# Patient Record
Sex: FEMALE | Race: WHITE | NOT HISPANIC OR LATINO | URBAN - METROPOLITAN AREA
[De-identification: names, ages, dates, MRNs, and addresses within clinical notes are randomized per-mention and may not be internally consistent; named-entity substitution may affect disease eponyms.]

---

## 2017-02-08 ENCOUNTER — OFFICE VISIT - RIVER FALLS (OUTPATIENT)
Dept: FAMILY MEDICINE | Facility: CLINIC | Age: 57
End: 2017-02-08

## 2017-02-08 ASSESSMENT — MIFFLIN-ST. JEOR: SCORE: 1256.01

## 2017-05-22 ENCOUNTER — OFFICE VISIT - RIVER FALLS (OUTPATIENT)
Dept: FAMILY MEDICINE | Facility: CLINIC | Age: 57
End: 2017-05-22

## 2017-05-22 ASSESSMENT — MIFFLIN-ST. JEOR: SCORE: 1273.7

## 2022-02-12 VITALS
BODY MASS INDEX: 27.72 KG/M2 | WEIGHT: 162.4 LBS | HEIGHT: 64 IN | SYSTOLIC BLOOD PRESSURE: 122 MMHG | HEART RATE: 76 BPM | TEMPERATURE: 98.9 F | DIASTOLIC BLOOD PRESSURE: 70 MMHG

## 2022-02-12 VITALS
HEIGHT: 64 IN | BODY MASS INDEX: 27.06 KG/M2 | HEART RATE: 76 BPM | WEIGHT: 158.5 LBS | TEMPERATURE: 97.9 F | SYSTOLIC BLOOD PRESSURE: 114 MMHG | DIASTOLIC BLOOD PRESSURE: 60 MMHG

## 2022-02-15 NOTE — PROGRESS NOTES
Patient:   DANNIELLE PULLIAM            MRN: [id number]            FIN: [id number]               Age:   56 years     Sex:  Female     :  1960   Associated Diagnoses:   ADHD (Attention Deficit Hyperactivity Disorder); Moderate recurrent major depression   Author:   Angel Ruffin MD      Chief Complaint   2017 3:28 PM CDT    Pt is here for med check      Interval History   Here for ADHD follow up. Has been going well with current medication.  Patient moving to [place] next week.  got called to a new Faith. Excited to be closer to their kids but sad to leave after 16 years.  Needs to refill medication. Working well.   Will pursue preventive services in [place] once they are settled.      Health Status   Allergies:    Allergic Reactions (All)  No Known Medication Allergies  Canceled/Inactive Reactions (All)  No known allergies   Medications:  (Selected)   Prescriptions  Prescribed  Adderall 10 mg oral tablet: 1 tab(s) ( 10 mg ), PO, BID, # 180 tab(s), 0 Refill(s), Type: Soft Stop, Pharmacy: [facility], 1 tab(s) po bid  citalopram 40 mg oral tablet: 1 tab(s) ( 40 mg ), po, daily, # 90 tab(s), 3 Refill(s), Type: Maintenance, Pharmacy: [facility], 1 tab(s) po daily  estradiol 1 mg oral tablet: See Instructions, Instructions: TAKE 1 TABLET DAILY, # 90 tab(s), 3 Refill(s), Type: Soft Stop, Pharmacy: [facility]  lamoTRIgine 200 mg oral tablet, extended release: 1 tab(s) ( 200 mg ), po, daily, # 90 tab(s), 3 Refill(s), Type: Maintenance, Pharmacy: [facility], 1 tab(s) po daily  medroxyPROGESTERone 2.5 mg oral tablet: See Instructions, Instructions: TAKE 1 TABLET DAILY, # 90 tab(s), 3 Refill(s), Type: Soft Stop, Pharmacy: [facility]  Documented Medications  Documented  antihistamines: antihistamines, daily, Supply, PRN: dermatitis, 0 Refill(s), Type: Maintenance  loratadine 10 mg oral capsule: 1 cap(s) ( 10 mg ),  po, daily, 0 Refill(s), Type: Maintenance   Problem list:    All Problems (Selected)  Moderate recurrent major depression / SNOMED CT 49191003 / Confirmed  Family history of colon cancer / SNOMED CT 739293449 / Confirmed  ADHD (Attention Deficit Hyperactivity Disorder) / SNOMED CT 20053345 / Confirmed      Histories   Past Medical History:    Active  ADHD (Attention Deficit Hyperactivity Disorder) (SNOMED CT 18712236)  Moderate recurrent major depression (SNOMED CT 03312629)  Resolved  Osteopenia (ICD-9-.90):  Resolved.  Obesity (SNOMED CT 1669503377):  Resolved.  Pregnancy (SNOMED CT 347528414):  Resolved on 12/29/1990 at 30 years.  Pregnancy (SNOMED CT 032121875):  Resolved on 9/17/1995 at 35 years.   Family History:    Aneurysm  Mother  Comments:  7/26/2012 2:10 PM - Amira Miranda LPN  Brain  Diabetes mellitus  Father  Comments:  7/26/2012 2:10 PM - Amira Miranda LPN  Type 2 DM  Hypertension  Mother  CAD - Coronary artery disease  Father     Procedure history:    Colonoscopy ([id number]) on 1/18/2011 at 50 Years.  Comments:  6/30/2011 9:07 AM - Trinity Epps CMA  q 5 years  LEEP on 1/1/1997 at 36 Years.  R ankle surgery in 1991 at 31 Years.   Social History:        Alcohol Assessment: Current            Wine (5 oz), 5-7 x's per week, 1 drinks/episode average.  2 drinks/episode maximum.      Tobacco Assessment: Denies Tobacco Use            Never      Substance Abuse Assessment: Denies Substance Abuse            Never      Employment and Education Assessment            Employed                     Comments:                      03/29/2016 - Amira Miranda LPN                     [employer]       Home and Environment Assessment            Marital status:  (Living together).  Spouse/Partner name: Edmond.  Lives with Children, Spouse.  2               children.  Living situation: Home/Independent.      Nutrition and Health Assessment            Type of diet: Regular, Calorie restricted.       Exercise and Physical Activity Assessment: Regular exercise            Exercise frequency: 4-5 x's weekly..  Exercise type: Walking.                     Comments:                      07/26/2012 - Ruth LOBATO, Amira                     Cardio,strength      Sexual Assessment            Sexually active: No.  Sexual orientation: Heterosexual.  Other contraceptive use:  had vasectomy.      Other Assessment            First menses age 15-16.  Irregular menses.  No history of abnormal Pap smear.        Physical Examination   Vital Signs   5/22/2017 3:28 PM CDT Temperature Tympanic 98.9 DegF    Peripheral Pulse Rate 76 bpm    Pulse Site Radial artery    HR Method Manual    Systolic Blood Pressure 122 mmHg    Diastolic Blood Pressure 70 mmHg    Mean Arterial Pressure 87 mmHg    BP Site Left arm    BP Method Manual      Measurements from flowsheet : Measurements   5/22/2017 3:28 PM CDT Height Measured - Standard 63.5 in    Weight Measured - Standard 162.4 lb    BSA 1.81 m2    Body Mass Index 28.31 kg/m2      General:  Alert and oriented, No acute distress.    Psychiatric:  Cooperative, Appropriate mood & affect, Normal judgment, Non-suicidal.       Impression and Plan   Diagnosis     ADHD (Attention Deficit Hyperactivity Disorder) (EXG36-HI F90.2).     Moderate recurrent major depression (AWG56-PO F33.1).     Orders     Orders (Selected)   Prescriptions  Prescribed  Adderall 10 mg oral tablet: 1 tab(s) ( 10 mg ), PO, BID, # 180 tab(s), 0 Refill(s), Type: Soft Stop, Pharmacy: [facility], 1 tab(s) po bid  citalopram 40 mg oral tablet: 1 tab(s) ( 40 mg ), po, daily, # 90 tab(s), 3 Refill(s), Type: Maintenance, Pharmacy: [facility], 1 tab(s) po daily  lamoTRIgine 200 mg oral tablet, extended release: 1 tab(s) ( 200 mg ), po, daily, # 90 tab(s), 3 Refill(s), Type: Maintenance, Pharmacy: [facility], 1 tab(s) po daily.

## 2022-02-15 NOTE — PROGRESS NOTES
Patient:   DANNIELLE PULLIAM            MRN: [id number]            FIN: [id number]               Age:   56 years     Sex:  Female     :  1960   Associated Diagnoses:   ADHD (Attention Deficit Hyperactivity Disorder); Family history of colon cancer   Author:   Angel Ruffin MD      Chief Complaint   2017 11:06 AM CST    ADHD med check      Interval History   Here for ADHD follow up. Doing well. No side effects noted.  Patient reports that estrace and medroxyprogesterone are working very well. Will continue same dose for a year and then consider decreasing estrogen dose.  Has family hx of colon cancer. Due for 5 year colonoscopy.         Health Status   Allergies:    Allergic Reactions (All)  No Known Medication Allergies  Canceled/Inactive Reactions (All)  No known allergies   Medications:  (Selected)   Prescriptions  Prescribed  Adderall 10 mg oral tablet: 1 tab(s) ( 10 mg ), PO, BID, # 180 tab(s), 0 Refill(s), Type: Soft Stop, Pharmacy: [facility], 1 tab(s) po bid  Estrace 1 mg oral tablet: 1 tab(s) ( 1 mg ), PO, Daily, # 90 tab(s), 2 Refill(s), Type: Maintenance, Pharmacy: [facility], 1 tab(s) po daily  citalopram 40 mg oral tablet: 1 tab(s) ( 40 mg ), po, daily, # 90 tab(s), 3 Refill(s), Type: Maintenance, Pharmacy: [facility], 1 tab(s) po daily  lamoTRIgine 200 mg oral tablet, extended release: 1 tab(s) ( 200 mg ), po, daily, # 90 tab(s), 3 Refill(s), Type: Maintenance, Pharmacy: [facility], 1 tab(s) po daily  medroxyPROGESTERone 2.5 mg oral tablet: 1 tab(s) ( 2.5 mg ), PO, Daily, # 90 tab(s), 2 Refill(s), Type: Maintenance, Pharmacy: [facility], 1 tab(s) po daily  Documented Medications  Documented  antihistamines: antihistamines, daily, Supply, PRN: dermatitis, 0 Refill(s), Type: Maintenance   Problem list:    All Problems (Selected)  ADHD (Attention Deficit Hyperactivity Disorder) / SNOMED CT  00757365 / Confirmed  Moderate recurrent major depression / SNOMED CT 37694532 / Confirmed  Obesity / SNOMED CT 4199125249 / Probable      Histories   Past Medical History:    Active  ADHD (Attention Deficit Hyperactivity Disorder) (SNOMED CT 03759654)  Moderate recurrent major depression (SNOMED CT 59806184)  Resolved  Osteopenia (ICD-9-.90):  Resolved.  Pregnancy (SNOMED CT 684491938):  Resolved on 12/29/1990 at 30 years.  Pregnancy (SNOMED CT 825080448):  Resolved on 9/17/1995 at 35 years.   Family History:    Aneurysm  Mother  Comments:  7/26/2012 2:10 PM - Amira Miranda LPN  Brain  Diabetes mellitus  Father  Comments:  7/26/2012 2:10 PM - Amira Miranda LPN  Type 2 DM  Hypertension  Mother  CAD - Coronary artery disease  Father     Procedure history:    Colonoscopy ([id number]) on 1/18/2011 at 50 Years.  Comments:  6/30/2011 9:07 AM - Trinity Epps CMA  q 5 years  LEEP on 1/1/1997 at 36 Years.  R ankle surgery in 1991 at 31 Years.   Social History:        Alcohol Assessment: Current            Wine (5 oz), 5-7 x's per week, 1 drinks/episode average.  2 drinks/episode maximum.      Tobacco Assessment: Denies Tobacco Use            Never      Substance Abuse Assessment: Denies Substance Abuse            Never      Employment and Education Assessment            Employed                     Comments:                      03/29/2016 - Amira Miranda LPN                     [employer]       Home and Environment Assessment            Marital status:  (Living together).  Spouse/Partner name: Edmond.  Lives with Children, Spouse.  2               children.  Living situation: Home/Independent.      Nutrition and Health Assessment            Type of diet: Regular, Calorie restricted.      Exercise and Physical Activity Assessment: Regular exercise            Exercise frequency: 4-5 x's weekly..  Exercise type: Walking.                     Comments:                      07/26/2012 - Amira Miranda LPN                      Cardio,strength      Sexual Assessment            Sexually active: No.  Sexual orientation: Heterosexual.  Other contraceptive use:  had vasectomy.      Other Assessment            First menses age 15-16.  Irregular menses.  No history of abnormal Pap smear.        Physical Examination   Vital Signs   2/8/2017 11:06 AM CST Temperature Tympanic 97.9 DegF    Peripheral Pulse Rate 76 bpm    Pulse Site Radial artery    HR Method Manual    Systolic Blood Pressure 114 mmHg    Diastolic Blood Pressure 60 mmHg    Mean Arterial Pressure 78 mmHg    BP Site Right arm    BP Method Manual      Measurements from flowsheet : Measurements   2/8/2017 11:06 AM CST Height Measured - Standard 63.5 in    Weight Measured - Standard 158.5 lb    BSA 1.79 m2    Body Mass Index 27.63 kg/m2      General:  Alert and oriented, No acute distress.    Eye:  Pupils are equal, round and reactive to light, Normal conjunctiva.    HENT:  Normocephalic, Oral mucosa is moist, No pharyngeal erythema.    Neck:  Supple, Non-tender, No lymphadenopathy.    Respiratory:  Lungs are clear to auscultation.    Cardiovascular:  Normal rate, Regular rhythm.       Impression and Plan   Diagnosis     ADHD (Attention Deficit Hyperactivity Disorder) (UGF72-RC F90.2).     Course:  Progressing as expected.    Orders     Orders (Selected)   Prescriptions  Prescribed  Adderall 10 mg oral tablet: 1 tab(s) ( 10 mg ), PO, BID, # 180 tab(s), 0 Refill(s), Type: Soft Stop, Pharmacy: [facility], 1 tab(s) po bid.     Diagnosis     Family history of colon cancer (DGT98-VP Z80.0).     Plan:  Referred for colonoscopy. ASA 1..

## 2023-10-20 PROCEDURE — 88175 CYTOPATH C/V AUTO FLUID REDO: CPT | Performed by: CLINICAL MEDICAL LABORATORY

## 2023-10-20 PROCEDURE — PSEU9939 HPV, HIGH RISK: Performed by: CLINICAL MEDICAL LABORATORY

## 2023-10-20 PROCEDURE — 87624 HPV HI-RISK TYP POOLED RSLT: CPT | Performed by: CLINICAL MEDICAL LABORATORY

## 2023-10-23 ENCOUNTER — LAB REQUISITION (OUTPATIENT)
Dept: LAB | Age: 63
End: 2023-10-23

## 2023-10-23 DIAGNOSIS — Z12.4 ENCOUNTER FOR SCREENING FOR MALIGNANT NEOPLASM OF CERVIX: ICD-10-CM

## 2023-11-01 LAB
CASE RPRT: NORMAL
CLINICAL INFO: NORMAL
CYTOLOGY CVX/VAG STUDY: NORMAL
HPV16+18+45 E6+E7MRNA CVX NAA+PROBE: NEGATIVE
Lab: NORMAL
PAP EDUCATIONAL NOTE: NORMAL
SPECIMEN ADEQUACY: NORMAL

## 2024-05-03 ENCOUNTER — IMAGING SERVICES (OUTPATIENT)
Dept: GENERAL RADIOLOGY | Age: 64
End: 2024-05-03
Attending: NURSE PRACTITIONER

## 2024-05-03 ENCOUNTER — WALK IN (OUTPATIENT)
Dept: URGENT CARE | Age: 64
End: 2024-05-03

## 2024-05-03 VITALS
SYSTOLIC BLOOD PRESSURE: 122 MMHG | RESPIRATION RATE: 14 BRPM | OXYGEN SATURATION: 96 % | HEART RATE: 80 BPM | TEMPERATURE: 100.5 F | DIASTOLIC BLOOD PRESSURE: 70 MMHG

## 2024-05-03 DIAGNOSIS — R05.1 ACUTE COUGH: ICD-10-CM

## 2024-05-03 DIAGNOSIS — J32.9 SINOBRONCHITIS: Primary | ICD-10-CM

## 2024-05-03 DIAGNOSIS — J40 SINOBRONCHITIS: Primary | ICD-10-CM

## 2024-05-03 PROCEDURE — 71046 X-RAY EXAM CHEST 2 VIEWS: CPT | Performed by: RADIOLOGY

## 2024-05-03 RX ORDER — CITALOPRAM 20 MG/1
20 TABLET ORAL DAILY
COMMUNITY

## 2024-05-03 RX ORDER — LAMOTRIGINE 200 MG/1
TABLET, ORALLY DISINTEGRATING ORAL
COMMUNITY

## 2024-05-03 RX ORDER — ATORVASTATIN CALCIUM 80 MG/1
TABLET, FILM COATED ORAL
COMMUNITY
Start: 2023-12-09

## 2024-05-03 RX ORDER — BENZONATATE 100 MG/1
100 CAPSULE ORAL 3 TIMES DAILY PRN
Qty: 30 CAPSULE | Refills: 0 | Status: SHIPPED | OUTPATIENT
Start: 2024-05-03

## 2024-05-03 RX ORDER — ALBUTEROL SULFATE 90 UG/1
2 AEROSOL, METERED RESPIRATORY (INHALATION) EVERY 4 HOURS PRN
Qty: 1 EACH | Refills: 0 | Status: SHIPPED | OUTPATIENT
Start: 2024-05-03

## 2024-05-03 RX ORDER — AZITHROMYCIN 250 MG/1
TABLET, FILM COATED ORAL
Qty: 6 TABLET | Refills: 0 | Status: SHIPPED | OUTPATIENT
Start: 2024-05-03

## 2024-05-03 ASSESSMENT — ENCOUNTER SYMPTOMS
WHEEZING: 0
SHORTNESS OF BREATH: 1
RHINORRHEA: 1
NEUROLOGICAL NEGATIVE: 1
GASTROINTESTINAL NEGATIVE: 1
FEVER: 1
SORE THROAT: 0
COUGH: 1

## 2024-09-17 ENCOUNTER — LAB REQUISITION (OUTPATIENT)
Dept: LAB | Age: 64
End: 2024-09-17

## 2024-09-17 DIAGNOSIS — R76.8 OTHER SPECIFIED ABNORMAL IMMUNOLOGICAL FINDINGS IN SERUM: ICD-10-CM

## 2024-09-17 DIAGNOSIS — D64.9 ANEMIA, UNSPECIFIED: ICD-10-CM

## 2024-09-17 PROCEDURE — PSEU8550 RHEUMATOID FACTOR: Performed by: CLINICAL MEDICAL LABORATORY

## 2024-09-17 PROCEDURE — 83521 IG LIGHT CHAINS FREE EACH: CPT | Performed by: CLINICAL MEDICAL LABORATORY

## 2024-09-17 PROCEDURE — 86038 ANTINUCLEAR ANTIBODIES: CPT | Performed by: CLINICAL MEDICAL LABORATORY

## 2024-09-17 PROCEDURE — PSEU8469 ANA SCREEN WITH REFLEX IFA: Performed by: CLINICAL MEDICAL LABORATORY

## 2024-09-17 PROCEDURE — 82585 ASSAY OF CRYOFIBRINOGEN: CPT | Performed by: CLINICAL MEDICAL LABORATORY

## 2024-09-17 PROCEDURE — 82595 ASSAY OF CRYOGLOBULIN: CPT | Performed by: CLINICAL MEDICAL LABORATORY

## 2024-09-17 PROCEDURE — 86157 COLD AGGLUTININ TITER: CPT | Performed by: CLINICAL MEDICAL LABORATORY

## 2024-09-17 PROCEDURE — PSEU8515 COLD AGGLUTININS: Performed by: CLINICAL MEDICAL LABORATORY

## 2024-09-17 PROCEDURE — 86334 IMMUNOFIX E-PHORESIS SERUM: CPT | Performed by: CLINICAL MEDICAL LABORATORY

## 2024-09-17 PROCEDURE — PSEU8518 CRYOGLOBULIN QUALITATIVE: Performed by: CLINICAL MEDICAL LABORATORY

## 2024-09-17 PROCEDURE — PSEU8110 IMMUNOFIXATION ELECTROPHORESIS WITH IGG,IGA,IGM AND KAPPA/LAMBDA FREE LIGHT CHAINS: Performed by: CLINICAL MEDICAL LABORATORY

## 2024-09-17 PROCEDURE — 86431 RHEUMATOID FACTOR QUANT: CPT | Performed by: CLINICAL MEDICAL LABORATORY

## 2024-09-17 PROCEDURE — 82784 ASSAY IGA/IGD/IGG/IGM EACH: CPT | Performed by: CLINICAL MEDICAL LABORATORY

## 2024-09-17 PROCEDURE — PSEU9487 CRYOFIBRINOGEN: Performed by: CLINICAL MEDICAL LABORATORY

## 2024-09-17 PROCEDURE — PSEU8523 HAPTOGLOBIN: Performed by: CLINICAL MEDICAL LABORATORY

## 2024-09-17 PROCEDURE — 83010 ASSAY OF HAPTOGLOBIN QUANT: CPT | Performed by: CLINICAL MEDICAL LABORATORY

## 2024-09-18 LAB
ANA SER QL IA: NEGATIVE
HAPTOGLOB SERPL-MCNC: 142 MG/DL (ref 30–200)
IGA SERPL-MCNC: 231 MG/DL (ref 70–400)
IGG SERPL-MCNC: 856 MG/DL (ref 700–1600)
IGM SERPL-MCNC: 99 MG/DL (ref 40–230)
KAPPA LC FREE SER NEPH-MCNC: 1.88 MG/DL (ref 0.33–1.94)
KAPPA LC/LAMBDA SER: 1.13 {RATIO} (ref 0.26–1.65)
LAMBDA LC FREE SER NEPH-MCNC: 1.67 MG/DL (ref 0.57–2.63)
PATH INTERP BLD-IMP: NORMAL
RHEUMATOID FACT SER NEPH-ACNC: <10 UNITS/ML

## 2024-09-20 LAB — CA TITR SERPL AGGL: NORMAL {TITER}

## 2024-09-22 LAB — CRYOFIB PLAS QL 3D INC: NORMAL

## 2024-09-25 LAB — CRYOGLOB SER QL: NOT DETECTED
